# Patient Record
Sex: FEMALE | URBAN - NONMETROPOLITAN AREA
[De-identification: names, ages, dates, MRNs, and addresses within clinical notes are randomized per-mention and may not be internally consistent; named-entity substitution may affect disease eponyms.]

---

## 2024-04-22 ENCOUNTER — APPOINTMENT (RX ONLY)
Dept: URBAN - NONMETROPOLITAN AREA CLINIC 4 | Facility: CLINIC | Age: 29
Setting detail: DERMATOLOGY
End: 2024-04-22

## 2024-04-22 DIAGNOSIS — L91.0 HYPERTROPHIC SCAR: ICD-10-CM | Status: STABLE

## 2024-04-22 DIAGNOSIS — L21.8 OTHER SEBORRHEIC DERMATITIS: ICD-10-CM | Status: INADEQUATELY CONTROLLED

## 2024-04-22 PROCEDURE — 99204 OFFICE O/P NEW MOD 45 MIN: CPT | Mod: 25

## 2024-04-22 PROCEDURE — 11900 INJECT SKIN LESIONS </W 7: CPT

## 2024-04-22 PROCEDURE — ? COUNSELING

## 2024-04-22 PROCEDURE — ? PRESCRIPTION MEDICATION MANAGEMENT

## 2024-04-22 PROCEDURE — ? INTRALESIONAL KENALOG

## 2024-04-22 PROCEDURE — ? PRESCRIPTION

## 2024-04-22 PROCEDURE — ? PHOTO-DOCUMENTATION

## 2024-04-22 RX ORDER — KETOCONAZOLE 20 MG/ML
2% SHAMPOO, SUSPENSION TOPICAL QOD
Qty: 120 | Refills: 3 | Status: ERX | COMMUNITY
Start: 2024-04-22

## 2024-04-22 RX ORDER — CLOBETASOL PROPIONATE 0.5 MG/G
0.05% CREAM TOPICAL BID
Qty: 30 | Refills: 2 | Status: ERX | COMMUNITY
Start: 2024-04-22

## 2024-04-22 RX ORDER — CLOBETASOL PROPIONATE 0.5 MG/ML
0.05% SOLUTION TOPICAL BID PRN
Qty: 50 | Refills: 2 | Status: ERX | COMMUNITY
Start: 2024-04-22

## 2024-04-22 RX ADMIN — KETOCONAZOLE 2%: 20 SHAMPOO, SUSPENSION TOPICAL at 00:00

## 2024-04-22 RX ADMIN — CLOBETASOL PROPIONATE 0.05%: 0.5 CREAM TOPICAL at 00:00

## 2024-04-22 RX ADMIN — CLOBETASOL PROPIONATE 0.05%: 0.5 SOLUTION TOPICAL at 00:00

## 2024-04-22 ASSESSMENT — LOCATION DETAILED DESCRIPTION DERM
LOCATION DETAILED: PERIUMBILICAL SKIN
LOCATION DETAILED: POSTERIOR MID-PARIETAL SCALP

## 2024-04-22 ASSESSMENT — LOCATION ZONE DERM
LOCATION ZONE: TRUNK
LOCATION ZONE: SCALP

## 2024-04-22 ASSESSMENT — SEVERITY ASSESSMENT: HOW SEVERE IS THIS PATIENT'S CONDITION?: MILD

## 2024-04-22 ASSESSMENT — LOCATION SIMPLE DESCRIPTION DERM
LOCATION SIMPLE: POSTERIOR SCALP
LOCATION SIMPLE: ABDOMEN

## 2024-04-22 ASSESSMENT — SCAR ASSESSEMENT OVERALL: SCAR ASSESSMENT: 2 (RAISED UNIFORM SCAR, NO ERYTHEMA)

## 2024-04-22 NOTE — PROCEDURE: PRESCRIPTION MEDICATION MANAGEMENT
Detail Level: Zone
Render In Strict Bullet Format?: No
Initiate Treatment: Clobetasol 0.05% cream BID
Initiate Treatment: Ketoconazole 2% shampoo Mondays, Wednesdays, Fridays, Clobetasol 0.05% scalp solution BID PRN

## 2024-04-22 NOTE — PROCEDURE: INTRALESIONAL KENALOG
Validate Note Data When Using Inventory: Yes
Concentration Of Kenalog Solution Injected (Mg/Ml): 5.0
Require Ndc Code?: No
How Many Mls Were Removed From The 80 Mg/Ml (5ml) Vial When Preparing The Injectable Solution?: 0
Kenalog Type Of Vial: Multiple Dose
How Many Mls Were Removed From The 10 Mg/Ml (5ml) Vial When Preparing The Injectable Solution?: 0.5
Which Kenalog Vial Was Used?: Kenalog 10 mg/ml (5 ml vial)
Medical Necessity Clause: This procedure was medically necessary because the lesions that were treated were:
Kenalog Preparation: Kenalog
Treatment Number (Optional): 1
Administered By (Optional): Ganesh Cornejo PA-C
Consent: The risks of atrophy were reviewed with the patient.
Detail Level: Detailed

## 2024-05-21 ENCOUNTER — RX ONLY (OUTPATIENT)
Age: 29
Setting detail: RX ONLY
End: 2024-05-21

## 2024-05-21 ENCOUNTER — APPOINTMENT (RX ONLY)
Dept: URBAN - NONMETROPOLITAN AREA CLINIC 4 | Facility: CLINIC | Age: 29
Setting detail: DERMATOLOGY
End: 2024-05-21

## 2024-05-21 DIAGNOSIS — L91.0 HYPERTROPHIC SCAR: ICD-10-CM | Status: IMPROVED

## 2024-05-21 PROCEDURE — ? PHOTO-DOCUMENTATION

## 2024-05-21 PROCEDURE — 11900 INJECT SKIN LESIONS </W 7: CPT

## 2024-05-21 PROCEDURE — ? INTRALESIONAL KENALOG

## 2024-05-21 PROCEDURE — ? PRESCRIPTION MEDICATION MANAGEMENT

## 2024-05-21 RX ORDER — CLOBETASOL PROPIONATE 0.5 MG/G
0.05% CREAM TOPICAL BID
Qty: 30 | Refills: 2 | Status: ERX

## 2024-05-21 ASSESSMENT — LOCATION DETAILED DESCRIPTION DERM
LOCATION DETAILED: PERIUMBILICAL SKIN
LOCATION DETAILED: LEFT LATERAL ABDOMEN

## 2024-05-21 ASSESSMENT — LOCATION SIMPLE DESCRIPTION DERM: LOCATION SIMPLE: ABDOMEN

## 2024-05-21 ASSESSMENT — LOCATION ZONE DERM: LOCATION ZONE: TRUNK

## 2024-05-21 ASSESSMENT — SCAR ASSESSEMENT OVERALL: SCAR ASSESSMENT: 1.5 (FLAT SCAR, ERYTHEMA ONLY)

## 2024-05-21 NOTE — PROCEDURE: PRESCRIPTION MEDICATION MANAGEMENT
Plan: Will follow up in 8 weeks .
Continue Regimen: Clobetasol 0.05% topical cream massage keloid QD.
Detail Level: Zone
Render In Strict Bullet Format?: No

## 2024-05-21 NOTE — PROCEDURE: INTRALESIONAL KENALOG
Expiration Date For Kenalog (Optional): Dec 2025
Which Kenalog Vial Was Used?: Kenalog 10 mg/ml (5 ml vial)
Detail Level: Detailed
Total Volume (Ccs): 1.0
Consent: The risks of atrophy were reviewed with the patient.
How Many Mls Were Removed From The 10 Mg/Ml (5ml) Vial When Preparing The Injectable Solution?: 0.5
Treatment Number (Optional): 2
How Many Mls Were Removed From The 80 Mg/Ml (5ml) Vial When Preparing The Injectable Solution?: 0
Include Z78.9 (Other Specified Conditions Influencing Health Status) As An Associated Diagnosis?: No
Concentration Of Kenalog Solution Injected (Mg/Ml): 5.0
Kenalog Preparation: Kenalog
Kenalog Type Of Vial: Multiple Dose
Administered By (Optional): Ganesh Cornejo PA-C
Medical Necessity Clause: This procedure was medically necessary because the lesions that were treated were:
Lot # For Kenalog (Optional): [id number]
Ndc# For Kenalog Only: 0403-1448-70
Validate Note Data When Using Inventory: Yes

## 2024-07-16 ENCOUNTER — APPOINTMENT (RX ONLY)
Dept: URBAN - NONMETROPOLITAN AREA CLINIC 4 | Facility: CLINIC | Age: 29
Setting detail: DERMATOLOGY
End: 2024-07-16

## 2024-07-16 DIAGNOSIS — L91.0 HYPERTROPHIC SCAR: ICD-10-CM | Status: WELL CONTROLLED

## 2024-07-16 PROCEDURE — ? PHOTO-DOCUMENTATION

## 2024-07-16 PROCEDURE — ? COUNSELING

## 2024-07-16 PROCEDURE — ? PRESCRIPTION MEDICATION MANAGEMENT

## 2024-07-16 PROCEDURE — 99213 OFFICE O/P EST LOW 20 MIN: CPT

## 2024-07-16 PROCEDURE — ? OTHER

## 2024-07-16 ASSESSMENT — LOCATION DETAILED DESCRIPTION DERM
LOCATION DETAILED: LEFT INGUINAL CREASE
LOCATION DETAILED: LEFT SUPRAPUBIC SKIN
LOCATION DETAILED: RIGHT LATERAL ABDOMEN

## 2024-07-16 ASSESSMENT — LOCATION SIMPLE DESCRIPTION DERM
LOCATION SIMPLE: ABDOMEN
LOCATION SIMPLE: GROIN

## 2024-07-16 ASSESSMENT — LOCATION ZONE DERM: LOCATION ZONE: TRUNK

## 2024-07-16 ASSESSMENT — SCAR ASSESSEMENT OVERALL: SCAR ASSESSMENT: 1.5 (FLAT SCAR, ERYTHEMA ONLY)

## 2024-07-16 NOTE — PROCEDURE: PRESCRIPTION MEDICATION MANAGEMENT
Detail Level: Zone
Continue Regimen: Massage area QD with Clobetasol 0,05% cream x 1 month then stop.
Render In Strict Bullet Format?: No

## 2024-07-16 NOTE — PROCEDURE: OTHER
Note Text (......Xxx Chief Complaint.): This diagnosis correlates with the
Detail Level: Zone
Render Risk Assessment In Note?: no
Other (Free Text): Patient advised to get consultation with plastic surgeon if she wants to even out the color of the scar.